# Patient Record
Sex: MALE | Race: WHITE | NOT HISPANIC OR LATINO | URBAN - NONMETROPOLITAN AREA
[De-identification: names, ages, dates, MRNs, and addresses within clinical notes are randomized per-mention and may not be internally consistent; named-entity substitution may affect disease eponyms.]

---

## 2019-02-01 NOTE — PATIENT DISCUSSION
(T47.254) Keratoconjunct sicca, not specified as Sjogren's, bilateral - Assesment : Examination revealed Dry Eye Syndrome OU. - Plan : Monitor for changes. Advised patient to call our office with decreased vision or increased symptoms. Continue  use of ATs regularly, 2-4 times everyday. Updated GLRx given and recommended updating OD only. Restart Restasis BID OU. Advised to do warm compresses twice a week for comfort OU. RTC 2 months follow up, sooner if problems or changes occur.

## 2019-02-01 NOTE — PATIENT DISCUSSION
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 .  OD Central scar Superficial , temporally goes deeper, Sub epi haze,  Triangular off fixation - Plan : Review of causes discussed with patient.

## 2019-02-01 NOTE — PATIENT DISCUSSION
(H02.132) Senile ectropion of right lower eyelid - Assesment : Examination revealed Senile Ectropion. - Plan : Monitor for changes.

## 2019-02-01 NOTE — PATIENT DISCUSSION
(H35.373) Puckering of macula, bilateral - Assesment : Examination revealed ERM. OCT mac today suggests  stable OU - Plan : Monitor. Advised patient to call our office with decreased vision or increased symptoms.

## 2019-02-01 NOTE — PATIENT DISCUSSION
(H02.135) Senile ectropion of left lower eyelid - Assesment : Examination revealed Senile Ectropion. - Plan : See plan # 3

## 2021-09-16 ENCOUNTER — IMPORTED ENCOUNTER (OUTPATIENT)
Dept: URBAN - NONMETROPOLITAN AREA CLINIC 1 | Facility: CLINIC | Age: 66
End: 2021-09-16

## 2021-09-16 PROBLEM — H52.13: Noted: 2021-09-16

## 2021-09-16 PROBLEM — H52.4: Noted: 2021-09-16

## 2021-09-16 PROBLEM — H25.13: Noted: 2021-09-16

## 2021-09-16 PROBLEM — H52.223: Noted: 2021-09-16

## 2021-09-16 PROCEDURE — S0620 ROUTINE OPHTHALMOLOGICAL EXA: HCPCS

## 2021-09-16 NOTE — PATIENT DISCUSSION
Compound Myopic Astigmatism OU -  discussed refractive status w/patient -  new spectacle Rx issued -  continue to monitor Cataracts OU -  discussed diagnosis in detail w/patient -  discussed signs and symptoms associated -  recommend UV protection -  no treatment indicated at this time -  continue to monitor

## 2022-04-10 ASSESSMENT — VISUAL ACUITY
OS_CC: 20/40
OS_PH: 20/25
OD_CC: 20/30+

## 2022-04-10 ASSESSMENT — TONOMETRY
OS_IOP_MMHG: 12
OD_IOP_MMHG: 12